# Patient Record
Sex: FEMALE | Race: WHITE
[De-identification: names, ages, dates, MRNs, and addresses within clinical notes are randomized per-mention and may not be internally consistent; named-entity substitution may affect disease eponyms.]

---

## 2018-01-03 ENCOUNTER — HOSPITAL ENCOUNTER (EMERGENCY)
Dept: HOSPITAL 76 - ED | Age: 23
Discharge: HOME | End: 2018-01-03
Payer: COMMERCIAL

## 2018-01-03 VITALS — SYSTOLIC BLOOD PRESSURE: 131 MMHG | DIASTOLIC BLOOD PRESSURE: 78 MMHG

## 2018-01-03 DIAGNOSIS — G43.909: Primary | ICD-10-CM

## 2018-01-03 PROCEDURE — 96374 THER/PROPH/DIAG INJ IV PUSH: CPT

## 2018-01-03 PROCEDURE — 96375 TX/PRO/DX INJ NEW DRUG ADDON: CPT

## 2018-01-03 PROCEDURE — 96361 HYDRATE IV INFUSION ADD-ON: CPT

## 2018-01-03 PROCEDURE — 99283 EMERGENCY DEPT VISIT LOW MDM: CPT

## 2018-01-03 NOTE — ED PHYSICIAN DOCUMENTATION
History of Present Illness





- Stated complaint


Stated Complaint: VOMITING/DIARRHEA





- Chief complaint


Chief Complaint: Neuro





- Additonal information


Additional information: 





hx from pt


23 y/o f


hx migraines in high school


today has a HA similar to her prior migraines


started with blurry vision in her L eye then gradually progressed into a 

worsening L sided HA with NV


no fever


no neck stiffness


no numbness or weakness


not a thunderclap HA


no CO exposure


no trauma





Review of Systems


Constitutional: denies: Fever, Chills


Eyes: reports: Photophobia


Ears: denies: Loss of hearing


Cardiac: denies: Chest pain / pressure


Respiratory: denies: Cough


GI: reports: Nausea, Vomiting.  denies: Abdominal Pain


: denies: Now pregnant EGA (denies LMP 12/06)


Musculoskeletal: denies: Neck pain


Neurologic: reports: Headache.  denies: Focal weakness, Numbness, Head injury


Immunocompromised: denies: Immunocompromised





PD PAST MEDICAL HISTORY





- Past Medical History


Past Medical History: No





- Past Surgical History


Past Surgical History: No





- Present Medications


Home Medications: 


 Ambulatory Orders











 Medication  Instructions  Recorded  Confirmed


 


No Known Home Medications [No  01/03/18 01/03/18





Known Home Medications]   














- Allergies


Allergies/Adverse Reactions: 


 Allergies











Allergy/AdvReac Type Severity Reaction Status Date / Time


 


No Known Drug Allergies Allergy   Verified 11/04/17 15:44














- Social History


Does the pt smoke?: No


Smoking Status: Never smoker


Does the pt drink ETOH?: No


Does the pt have substance abuse?: No





- Immunizations


Immunizations are current?: Yes





Results





- Vitals


Vitals: 


 Vital Signs - 24 hr











  01/03/18 01/03/18 01/03/18





  10:48 10:53 11:37


 


Temperature 35.5 C L 35.5 C L 


 


Heart Rate 78 90 69


 


Respiratory 18 18 16





Rate   


 


Blood Pressure 126/63 146/83 H 131/78 H


 


O2 Saturation 100 100 100








 Oxygen











O2 Source                      Room air

















PD MEDICAL DECISION MAKING





- ED course


ED course: 





pt felt much better








Departure





- Departure


Disposition: 01 Home, Self Care


Clinical Impression: 


Migraine


Qualifiers:


 Migraine type: unspecified Status migrainosus presence: without status 

migrainosus Intractability: not intractable Qualified Code(s): G43.909 - 

Migraine, unspecified, not intractable, without status migrainosus





Condition: Good


Instructions:  ED Headache Migraine


Follow-Up: 


[facility] [place] [Provider Group]


Comments: 


Please follow up with your PMD as needed.


If you start to get migraines more often, your PMD may want to prescribe 

medication to prevent or abort migraines


Forms:  Activity restrictions

## 2019-08-26 ENCOUNTER — HOSPITAL ENCOUNTER (OUTPATIENT)
Dept: HOSPITAL 76 - DI | Age: 24
Discharge: HOME | End: 2019-08-26
Attending: STUDENT IN AN ORGANIZED HEALTH CARE EDUCATION/TRAINING PROGRAM
Payer: COMMERCIAL

## 2019-08-26 DIAGNOSIS — M54.17: Primary | ICD-10-CM

## 2019-08-26 DIAGNOSIS — G12.9: ICD-10-CM

## 2019-08-26 PROCEDURE — 72148 MRI LUMBAR SPINE W/O DYE: CPT

## 2019-08-26 NOTE — MRI REPORT
Reason:  SPONDYLOLISTHESIS LUMBOSACRAL REGION

Procedure Date:  08/26/2019   

Accession Number:  [id number] / [id number]                    

Procedure:  MRI - Lumbar Spine W/O CPT Code:  

 

FULL RESULT:

 

 

EXAM:

MRI LUMBAR SPINE WITHOUT CONTRAST

 

EXAM DATE: 8/26/2019 10:08 AM.

 

CLINICAL HISTORY: Spondylolisthesis lumbosacral region.

 

COMPARISON: None.

 

TECHNIQUE: Multiplanar, multisequence T1-weighted and fluid-sensitive 

sequences of the lumbar spine from T12 to S1 without contrast. Other: 

None.

 

FINDINGS:

Spinal Canal: The conus terminates at L1. The conus medullaris and cauda 

equina are unremarkable.

 

Alignment: No scoliosis or spondylolisthesis.

 

Bone Marrow: Five non-rib-bearing lumbar vertebral bodies are assumed. No 

gross fractures or bone lesions. No bone marrow replacement.

 

Disk Levels/Facets:

T12-L1: Unremarkable.

 

L1-L2: Unremarkable.

 

L2-L3: Unremarkable.

 

L3-L4: Normal disk, probably facets. No stenosis.

 

L4-L5: There is a mild broad-based disk bulge present, slight indentation 

of the thecal sac. Prominent facets. No stenosis.

 

L5-S1: Unremarkable.

 

Musculature: Moderate fatty atrophy of the multifidus muscle.

 

Other: The partially visualized retroperitoneum is unremarkable.

IMPRESSION:

1. Conus terminates at L1 which is normal. No scoliosis or listhesis is 

noted. Moderate fatty atrophy of the multifidus muscle is seen.

2. L2-L3 is normal.

3. L3-L4 shows no stenosis, prominent facets and normal disk.

4. L4-L5 shows a mild broad-based disk bulge and slight indentation of 

ventral thecal sac. No stenosis.

5. L5-S1 is normal.

 

Comment: The following findings are so common in adults without low back 

pain that while we report their presence, they must be interpreted with 

caution and in the context of the clinical situation. (Reference Rashardvik 

et al, Spine 2001)

 

Prevalence of findings in patients without low back pain:

Disk degeneration (any evidence): 92%

Disk desiccation/T2 signal loss: 83%

Disk height loss: 56%

Disk bulge: 64%

Disk protrusion: 32%

Annular tear/high intensity zone: 38%

 

[facility]